# Patient Record
Sex: FEMALE | URBAN - METROPOLITAN AREA
[De-identification: names, ages, dates, MRNs, and addresses within clinical notes are randomized per-mention and may not be internally consistent; named-entity substitution may affect disease eponyms.]

---

## 2017-04-12 ENCOUNTER — OFFICE (OUTPATIENT)
Dept: URBAN - METROPOLITAN AREA CLINIC 33 | Facility: CLINIC | Age: 52
End: 2017-04-12

## 2017-04-12 VITALS
WEIGHT: 146 LBS | DIASTOLIC BLOOD PRESSURE: 80 MMHG | TEMPERATURE: 97.5 F | HEIGHT: 62 IN | HEART RATE: 74 BPM | SYSTOLIC BLOOD PRESSURE: 106 MMHG

## 2017-04-12 DIAGNOSIS — K21.9 GASTRO-ESOPHAGEAL REFLUX DISEASE WITHOUT ESOPHAGITIS: ICD-10-CM

## 2017-04-12 DIAGNOSIS — R19.4 CHANGE IN BOWEL HABIT: ICD-10-CM

## 2017-04-12 PROCEDURE — 99244 OFF/OP CNSLTJ NEW/EST MOD 40: CPT

## 2017-04-12 PROCEDURE — 00031: CPT

## 2017-04-12 NOTE — SERVICEHPINOTES
MEGAN HILARIO   is a   51   year old    female from [facility], who is being seen in consultation at the request of   ANDRÉS BERKOWITZ   for GERD. She has had GERD for about 2-3 months, has gotten progressively worse. A couple of times has felt acid come up into her mouth. Denies nocturnal GERD. She tried avoiding tomatoes and spicy foods with no change in GERD. She has tried zantac and Nexium without good improvement. Denies weight loss, dysphagia, and melena. No prior EGD.She has a BM every 2-3 days. She drinks a Chinese tea and eats prunes to help with regularity.  Stools used to be BSS type 3-4, now they are type BSS 5-6 and type 1. No blood present. Denies abdominal pain. No family history of colon cancer. No prior colonoscopy.